# Patient Record
Sex: MALE | Race: BLACK OR AFRICAN AMERICAN | Employment: UNEMPLOYED | URBAN - METROPOLITAN AREA
[De-identification: names, ages, dates, MRNs, and addresses within clinical notes are randomized per-mention and may not be internally consistent; named-entity substitution may affect disease eponyms.]

---

## 2017-03-02 ENCOUNTER — HOSPITAL ENCOUNTER (OUTPATIENT)
Dept: GENERAL RADIOLOGY | Age: 57
Discharge: HOME | End: 2017-03-02
Payer: COMMERCIAL

## 2017-03-02 ENCOUNTER — HOSPITAL ENCOUNTER (OUTPATIENT)
Age: 57
Discharge: HOME | End: 2017-03-02
Payer: COMMERCIAL

## 2017-03-02 DIAGNOSIS — I15.9 SECONDARY HYPERTENSION: ICD-10-CM

## 2017-03-02 PROCEDURE — 71020 XR CHEST STANDARD TWO VW: CPT

## 2017-03-07 ENCOUNTER — HOSPITAL ENCOUNTER (OUTPATIENT)
Dept: PHYSICAL THERAPY | Age: 57
Setting detail: THERAPIES SERIES
Discharge: HOME | End: 2017-03-07
Payer: COMMERCIAL

## 2017-03-07 PROCEDURE — 97162 PT EVAL MOD COMPLEX 30 MIN: CPT

## 2017-03-07 PROCEDURE — 97110 THERAPEUTIC EXERCISES: CPT

## 2017-03-09 ENCOUNTER — HOSPITAL ENCOUNTER (OUTPATIENT)
Dept: PHYSICAL THERAPY | Age: 57
Setting detail: THERAPIES SERIES
Discharge: HOME | End: 2017-03-09
Payer: COMMERCIAL

## 2017-03-09 PROCEDURE — 97110 THERAPEUTIC EXERCISES: CPT

## 2017-03-14 ENCOUNTER — HOSPITAL ENCOUNTER (OUTPATIENT)
Dept: PHYSICAL THERAPY | Age: 57
Setting detail: THERAPIES SERIES
Discharge: HOME | End: 2017-03-14
Payer: COMMERCIAL

## 2017-03-14 PROCEDURE — 97110 THERAPEUTIC EXERCISES: CPT

## 2017-03-16 ENCOUNTER — HOSPITAL ENCOUNTER (OUTPATIENT)
Dept: PHYSICAL THERAPY | Age: 57
Setting detail: THERAPIES SERIES
Discharge: HOME | End: 2017-03-16
Payer: COMMERCIAL

## 2017-03-16 PROCEDURE — 97110 THERAPEUTIC EXERCISES: CPT

## 2017-03-21 ENCOUNTER — HOSPITAL ENCOUNTER (OUTPATIENT)
Dept: PHYSICAL THERAPY | Age: 57
Setting detail: THERAPIES SERIES
Discharge: HOME | End: 2017-03-21
Payer: COMMERCIAL

## 2017-03-21 PROCEDURE — 97110 THERAPEUTIC EXERCISES: CPT

## 2017-03-23 ENCOUNTER — HOSPITAL ENCOUNTER (OUTPATIENT)
Dept: PHYSICAL THERAPY | Age: 57
Setting detail: THERAPIES SERIES
Discharge: HOME | End: 2017-03-23
Payer: COMMERCIAL

## 2017-03-23 PROCEDURE — 97110 THERAPEUTIC EXERCISES: CPT

## 2017-03-31 ENCOUNTER — HOSPITAL ENCOUNTER (OUTPATIENT)
Dept: PHYSICAL THERAPY | Age: 57
Setting detail: THERAPIES SERIES
Discharge: HOME | End: 2017-03-31
Payer: COMMERCIAL

## 2017-03-31 PROCEDURE — 97110 THERAPEUTIC EXERCISES: CPT

## 2019-12-16 ENCOUNTER — HOSPITAL ENCOUNTER (OUTPATIENT)
Age: 59
Discharge: HOME | End: 2019-12-16
Payer: COMMERCIAL

## 2019-12-16 LAB
ABSOLUTE EOS #: 0.1 K/UL (ref 0–0.44)
ABSOLUTE IMMATURE GRANULOCYTE: <0.03 K/UL (ref 0–0.3)
ABSOLUTE LYMPH #: 1.28 K/UL (ref 1.1–3.7)
ABSOLUTE MONO #: 0.22 K/UL (ref 0.1–1.2)
ALBUMIN SERPL-MCNC: 3.9 G/DL (ref 3.5–5.2)
ALBUMIN/GLOBULIN RATIO: 1.3 (ref 1–2.5)
ALP BLD-CCNC: 60 U/L (ref 40–129)
ALT SERPL-CCNC: 18 U/L (ref 5–41)
ANION GAP SERPL CALCULATED.3IONS-SCNC: 9 MMOL/L (ref 9–17)
AST SERPL-CCNC: 22 U/L
BASOPHILS # BLD: 0 % (ref 0–2)
BASOPHILS ABSOLUTE: <0.03 K/UL (ref 0–0.2)
BILIRUB SERPL-MCNC: 0.54 MG/DL (ref 0.3–1.2)
BUN BLDV-MCNC: 10 MG/DL (ref 6–20)
BUN/CREAT BLD: ABNORMAL (ref 9–20)
CALCIUM SERPL-MCNC: 9 MG/DL (ref 8.6–10.4)
CHLORIDE BLD-SCNC: 102 MMOL/L (ref 98–107)
CHOLESTEROL, FASTING: 168 MG/DL
CHOLESTEROL/HDL RATIO: 2.4
CO2: 27 MMOL/L (ref 20–31)
CREAT SERPL-MCNC: 0.69 MG/DL (ref 0.7–1.2)
DIFFERENTIAL TYPE: ABNORMAL
EOSINOPHILS RELATIVE PERCENT: 3 % (ref 1–4)
GFR AFRICAN AMERICAN: >60 ML/MIN
GFR NON-AFRICAN AMERICAN: >60 ML/MIN
GFR SERPL CREATININE-BSD FRML MDRD: ABNORMAL ML/MIN/{1.73_M2}
GFR SERPL CREATININE-BSD FRML MDRD: ABNORMAL ML/MIN/{1.73_M2}
GLUCOSE BLD-MCNC: 98 MG/DL (ref 70–99)
HCT VFR BLD CALC: 42 % (ref 40.7–50.3)
HDLC SERPL-MCNC: 69 MG/DL
HEMOGLOBIN: 14.1 G/DL (ref 13–17)
IMMATURE GRANULOCYTES: 0 %
LDL CHOLESTEROL: 88 MG/DL (ref 0–130)
LYMPHOCYTES # BLD: 41 % (ref 24–43)
MCH RBC QN AUTO: 29.3 PG (ref 25.2–33.5)
MCHC RBC AUTO-ENTMCNC: 33.6 G/DL (ref 28.4–34.8)
MCV RBC AUTO: 87.3 FL (ref 82.6–102.9)
MONOCYTES # BLD: 7 % (ref 3–12)
NRBC AUTOMATED: 0 PER 100 WBC
PDW BLD-RTO: 12.1 % (ref 11.8–14.4)
PLATELET # BLD: 269 K/UL (ref 138–453)
PLATELET ESTIMATE: ABNORMAL
PMV BLD AUTO: 9.7 FL (ref 8.1–13.5)
POTASSIUM SERPL-SCNC: 3.7 MMOL/L (ref 3.7–5.3)
RBC # BLD: 4.81 M/UL (ref 4.21–5.77)
RBC # BLD: ABNORMAL 10*6/UL
SEG NEUTROPHILS: 49 % (ref 36–65)
SEGMENTED NEUTROPHILS ABSOLUTE COUNT: 1.51 K/UL (ref 1.5–8.1)
SODIUM BLD-SCNC: 138 MMOL/L (ref 135–144)
THYROXINE, FREE: 1.12 NG/DL (ref 0.93–1.7)
TOTAL PROTEIN: 6.8 G/DL (ref 6.4–8.3)
TRIGLYCERIDE, FASTING: 55 MG/DL
TSH SERPL DL<=0.05 MIU/L-ACNC: 2.8 MIU/L (ref 0.3–5)
VITAMIN D 25-HYDROXY: 29.2 NG/ML (ref 30–100)
VLDLC SERPL CALC-MCNC: NORMAL MG/DL (ref 1–30)
WBC # BLD: 3.1 K/UL (ref 3.5–11.3)
WBC # BLD: ABNORMAL 10*3/UL

## 2019-12-16 PROCEDURE — 85025 COMPLETE CBC W/AUTO DIFF WBC: CPT

## 2019-12-16 PROCEDURE — 36415 COLL VENOUS BLD VENIPUNCTURE: CPT

## 2019-12-16 PROCEDURE — 84443 ASSAY THYROID STIM HORMONE: CPT

## 2019-12-16 PROCEDURE — 84439 ASSAY OF FREE THYROXINE: CPT

## 2019-12-16 PROCEDURE — 82306 VITAMIN D 25 HYDROXY: CPT

## 2019-12-16 PROCEDURE — 80053 COMPREHEN METABOLIC PANEL: CPT

## 2019-12-16 PROCEDURE — 80061 LIPID PANEL: CPT

## 2021-02-11 ENCOUNTER — HOSPITAL ENCOUNTER (OUTPATIENT)
Age: 61
Setting detail: SPECIMEN
Discharge: HOME | End: 2021-02-11
Payer: COMMERCIAL

## 2021-02-12 LAB
ABSOLUTE EOS #: 0.13 K/UL (ref 0–0.44)
ABSOLUTE IMMATURE GRANULOCYTE: 0.03 K/UL (ref 0–0.3)
ABSOLUTE LYMPH #: 1.47 K/UL (ref 1.1–3.7)
ABSOLUTE MONO #: 0.4 K/UL (ref 0.1–1.2)
ALBUMIN SERPL-MCNC: 4 G/DL (ref 3.5–5.2)
ALBUMIN/GLOBULIN RATIO: 1.4 (ref 1–2.5)
ALP BLD-CCNC: 73 U/L (ref 40–129)
ALT SERPL-CCNC: 19 U/L (ref 5–41)
ANION GAP SERPL CALCULATED.3IONS-SCNC: 10 MMOL/L (ref 9–17)
AST SERPL-CCNC: 23 U/L
BASOPHILS # BLD: 1 % (ref 0–2)
BASOPHILS ABSOLUTE: 0.03 K/UL (ref 0–0.2)
BILIRUB SERPL-MCNC: 0.4 MG/DL (ref 0.3–1.2)
BUN BLDV-MCNC: 12 MG/DL (ref 8–23)
BUN/CREAT BLD: ABNORMAL (ref 9–20)
CALCIUM SERPL-MCNC: 9 MG/DL (ref 8.6–10.4)
CHLORIDE BLD-SCNC: 103 MMOL/L (ref 98–107)
CHOLESTEROL/HDL RATIO: 3
CHOLESTEROL: 160 MG/DL
CO2: 27 MMOL/L (ref 20–31)
CREAT SERPL-MCNC: 0.94 MG/DL (ref 0.7–1.2)
DIFFERENTIAL TYPE: ABNORMAL
EOSINOPHILS RELATIVE PERCENT: 3 % (ref 1–4)
GFR AFRICAN AMERICAN: >60 ML/MIN
GFR NON-AFRICAN AMERICAN: >60 ML/MIN
GFR SERPL CREATININE-BSD FRML MDRD: ABNORMAL ML/MIN/{1.73_M2}
GFR SERPL CREATININE-BSD FRML MDRD: ABNORMAL ML/MIN/{1.73_M2}
GLUCOSE BLD-MCNC: 115 MG/DL (ref 70–99)
HCT VFR BLD CALC: 41.9 % (ref 40.7–50.3)
HDLC SERPL-MCNC: 54 MG/DL
HEMOGLOBIN: 13.5 G/DL (ref 13–17)
IMMATURE GRANULOCYTES: 1 %
LDL CHOLESTEROL: 95 MG/DL (ref 0–130)
LYMPHOCYTES # BLD: 39 % (ref 24–43)
MCH RBC QN AUTO: 28.4 PG (ref 25.2–33.5)
MCHC RBC AUTO-ENTMCNC: 32.2 G/DL (ref 28.4–34.8)
MCV RBC AUTO: 88 FL (ref 82.6–102.9)
MONOCYTES # BLD: 11 % (ref 3–12)
NRBC AUTOMATED: 0 PER 100 WBC
PDW BLD-RTO: 12.4 % (ref 11.8–14.4)
PLATELET # BLD: 284 K/UL (ref 138–453)
PLATELET ESTIMATE: ABNORMAL
PMV BLD AUTO: 11 FL (ref 8.1–13.5)
POTASSIUM SERPL-SCNC: 4.1 MMOL/L (ref 3.7–5.3)
RBC # BLD: 4.76 M/UL (ref 4.21–5.77)
RBC # BLD: ABNORMAL 10*6/UL
SEG NEUTROPHILS: 45 % (ref 36–65)
SEGMENTED NEUTROPHILS ABSOLUTE COUNT: 1.75 K/UL (ref 1.5–8.1)
SODIUM BLD-SCNC: 140 MMOL/L (ref 135–144)
TOTAL PROTEIN: 6.8 G/DL (ref 6.4–8.3)
TRIGL SERPL-MCNC: 53 MG/DL
TSH SERPL DL<=0.05 MIU/L-ACNC: 2.66 MIU/L (ref 0.3–5)
VITAMIN D 25-HYDROXY: 29.6 NG/ML (ref 30–100)
VLDLC SERPL CALC-MCNC: NORMAL MG/DL (ref 1–30)
WBC # BLD: 3.8 K/UL (ref 3.5–11.3)
WBC # BLD: ABNORMAL 10*3/UL

## 2023-03-07 ENCOUNTER — HOSPITAL ENCOUNTER (OUTPATIENT)
Age: 63
Setting detail: SPECIMEN
Discharge: HOME | End: 2023-03-07

## 2023-03-08 LAB
ERYTHROCYTE [SEDIMENTATION RATE] IN BLOOD BY WESTERGREN METHOD: 5 MM/HR (ref 0–20)
EST. AVERAGE GLUCOSE BLD GHB EST-MCNC: 120 MG/DL
FOLATE SERPL-MCNC: 15.9 NG/ML
HBA1C MFR BLD: 5.8 % (ref 4–6)
TSH SERPL-ACNC: 2.37 UIU/ML (ref 0.3–5)
VIT B12 SERPL-MCNC: 862 PG/ML (ref 232–1245)

## 2023-03-10 LAB
ANA SER QL IA: NEGATIVE
DSDNA IGG SER QL IA: <0.5 IU/ML
NUCLEAR IGG SER IA-RTO: 0.3 U/ML

## 2023-11-15 ENCOUNTER — OFFICE VISIT (OUTPATIENT)
Dept: ORTHOPEDIC SURGERY | Age: 63
End: 2023-11-15

## 2023-11-15 VITALS — WEIGHT: 216 LBS | HEIGHT: 68 IN | BODY MASS INDEX: 32.74 KG/M2

## 2023-11-15 DIAGNOSIS — G56.03 BILATERAL CARPAL TUNNEL SYNDROME: ICD-10-CM

## 2023-11-15 DIAGNOSIS — R52 PAIN: Primary | ICD-10-CM

## 2023-11-15 RX ORDER — BUPIVACAINE HYDROCHLORIDE 2.5 MG/ML
0.5 INJECTION, SOLUTION INFILTRATION; PERINEURAL ONCE
Status: COMPLETED | OUTPATIENT
Start: 2023-11-15 | End: 2023-11-15

## 2023-11-15 RX ORDER — LOSARTAN POTASSIUM AND HYDROCHLOROTHIAZIDE 25; 100 MG/1; MG/1
1 TABLET ORAL
COMMUNITY

## 2023-11-15 RX ORDER — OLOPATADINE HYDROCHLORIDE 1 MG/ML
SOLUTION/ DROPS OPHTHALMIC
COMMUNITY
Start: 2023-11-09

## 2023-11-15 RX ORDER — CELECOXIB 100 MG/1
CAPSULE ORAL
COMMUNITY
Start: 2023-10-16

## 2023-11-15 RX ORDER — BETAMETHASONE SODIUM PHOSPHATE AND BETAMETHASONE ACETATE 3; 3 MG/ML; MG/ML
6 INJECTION, SUSPENSION INTRA-ARTICULAR; INTRALESIONAL; INTRAMUSCULAR; SOFT TISSUE ONCE
Status: COMPLETED | OUTPATIENT
Start: 2023-11-15 | End: 2023-11-15

## 2023-11-15 RX ORDER — LIDOCAINE HYDROCHLORIDE 10 MG/ML
0.5 INJECTION, SOLUTION INFILTRATION; PERINEURAL ONCE
Status: COMPLETED | OUTPATIENT
Start: 2023-11-15 | End: 2023-11-15

## 2023-11-15 RX ORDER — SIMVASTATIN 10 MG
TABLET ORAL
COMMUNITY
Start: 2023-10-30

## 2023-11-15 RX ORDER — PREDNISONE 10 MG/1
TABLET ORAL
COMMUNITY
Start: 2023-11-10

## 2023-11-15 RX ORDER — FLUTICASONE PROPIONATE 50 MCG
SPRAY, SUSPENSION (ML) NASAL
COMMUNITY
Start: 2023-11-09

## 2023-11-15 RX ADMIN — LIDOCAINE HYDROCHLORIDE 0.5 ML: 10 INJECTION, SOLUTION INFILTRATION; PERINEURAL at 10:08

## 2023-11-15 RX ADMIN — BETAMETHASONE SODIUM PHOSPHATE AND BETAMETHASONE ACETATE 6 MG: 3; 3 INJECTION, SUSPENSION INTRA-ARTICULAR; INTRALESIONAL; INTRAMUSCULAR; SOFT TISSUE at 10:03

## 2023-11-15 RX ADMIN — BUPIVACAINE HYDROCHLORIDE 1.25 MG: 2.5 INJECTION, SOLUTION INFILTRATION; PERINEURAL at 10:05

## 2023-11-15 RX ADMIN — LIDOCAINE HYDROCHLORIDE 0.5 ML: 10 INJECTION, SOLUTION INFILTRATION; PERINEURAL at 10:07

## 2023-11-15 RX ADMIN — BUPIVACAINE HYDROCHLORIDE 1.25 MG: 2.5 INJECTION, SOLUTION INFILTRATION; PERINEURAL at 10:07

## 2023-11-15 RX ADMIN — BETAMETHASONE SODIUM PHOSPHATE AND BETAMETHASONE ACETATE 6 MG: 3; 3 INJECTION, SUSPENSION INTRA-ARTICULAR; INTRALESIONAL; INTRAMUSCULAR; SOFT TISSUE at 10:05

## 2023-11-15 NOTE — PROGRESS NOTES
deformity. Nontender to palpation including the first [facility] joint. Negative Oralia's test.  Negative Tinel's at the carpal tunnel. Negative reverse Phalen's. Compartments soft. Med/Rad/Ulnar/AIN/PIN motor intact. Axil/MSC/Med/Rad/Ulnar n sensation intact to light touch. Radial pulse 2+. LUE: No laceration, or abrasion. Full range of motion of the fingers with pulp to palm flexion and full extension. There is no outward deformity. Nontender to palpation including the first [facility] joint. Negative Oralia's test.  Negative Tinel's at the carpal tunnel. Negative reverse Phalen's. Compartments soft. Med/Rad/Ulnar/AIN/PIN motor intact. Axil/MSC/Med/Rad/Ulnar n sensation intact to light touch. Radial pulse 2+. Radiology:   History: Right hand swelling and stiffness    Comparison: None    Findings: Films with 3 views of the right hand (AP, lateral, oblique) in a skeletally mature individual demonstrating no acute osseous abnormalities. There appears to be intact Gilula's lines. Well maintained joint spaces throughout the interphalangeal joints and carpal bones. No lytic or blastic lesions noted. No subluxations or dislocations noted. Impression: No acute osseous abnormalities       History: Left hand swelling and stiffness    Comparison: None    Findings: Films with 3 views of the left hand (AP, lateral, oblique) in a skeletally mature individual demonstrating no acute osseous abnormalities. There appears to be intact Gilula's lines. Well maintained joint spaces throughout the interphalangeal joints and carpal bones. No lytic or blastic lesions noted. No subluxations or dislocations noted. Impression: No acute osseous abnormalities    ASSESSMENT:  59 y.o. male with bilateral carpal tunnel    PLAN:  -Lengthy discussion took place with the patient regarding his bilateral hand swelling and pain. He does have EMG studies demonstrating severe bilateral carpal tunnel syndrome.   We discussed his x-ray

## 2024-05-08 ENCOUNTER — OFFICE VISIT (OUTPATIENT)
Dept: ORTHOPEDIC SURGERY | Age: 64
End: 2024-05-08
Payer: COMMERCIAL

## 2024-05-08 VITALS — WEIGHT: 234 LBS | HEIGHT: 68 IN | BODY MASS INDEX: 35.46 KG/M2

## 2024-05-08 DIAGNOSIS — Z01.818 PRE-OP TESTING: Primary | ICD-10-CM

## 2024-05-08 PROCEDURE — 1036F TOBACCO NON-USER: CPT

## 2024-05-08 PROCEDURE — G8427 DOCREV CUR MEDS BY ELIG CLIN: HCPCS

## 2024-05-08 PROCEDURE — 3017F COLORECTAL CA SCREEN DOC REV: CPT

## 2024-05-08 PROCEDURE — G8417 CALC BMI ABV UP PARAM F/U: HCPCS

## 2024-05-08 PROCEDURE — 99213 OFFICE O/P EST LOW 20 MIN: CPT

## 2024-05-08 NOTE — PROGRESS NOTES
[facility] Pending [facility] to [facility]  [address]  [place] [address]  Dept: [phone]  Dept Fax: [phone]        Orthopaedic Clinic Follow Up      Subjective:       Orion Paiz is a 64 y.o. year old male who presents to the clinic today for routine follow up regarding his bilateral wrist and hand pain that has been ongoing for the past several years, but has had minimal releif.  Patient was last seen in our clinic on 11/15/2023, and EMG results were discussed with the patient, which demonstrated severe carpal tunnel syndrome to the bilateral wrist.  Patient at that time did not want any surgical intervention, but he comes in today for discussion about carpal tunnel release.  Patient states that his left is worse than his right, and would like to proceed with carpal tunnel release to the left.    Patient works as a  using equipment because of vibration in his hands and, patient recently says that his  strength is significantly decreased.  Patient has tried cock up wrist splints to the bilateral lower extremities, that helped with his nighttime symptoms.  He was last given a carpal tunnel injection at his last visit with about 2 months of relief, with 70% relief.  He denies any fevers or chills.  Denies any neck previous neck pains or surgery.    Review of Systems  Gen: no fever, chills, malaise  CV: no chest pain or palpitations  Resp: no cough or shortness of breath  GI: no nausea, vomiting, diarrhea, or constipation  Neuro: no seizures, vertigo, or headache  Msk: As per HPI  10 remaining systems reviewed and negative    Objective :   There were no vitals filed for this visit.Body mass index is 35.58 kg/m².  General: No acute distress, resting comfortably in the clinic  Neuro: alert. oriented  Eyes: Extra-ocular muscles intact  Pulm: Respirations unlabored and regular.  Skin: warm, well perfused  Psych:   Patient has good fund of knowledge

## 2024-05-24 ENCOUNTER — HOSPITAL ENCOUNTER (OUTPATIENT)
Dept: PREADMISSION TESTING | Age: 64
End: 2024-05-24
Payer: COMMERCIAL

## 2024-05-24 VITALS
RESPIRATION RATE: 14 BRPM | HEART RATE: 80 BPM | HEIGHT: 68 IN | TEMPERATURE: 97.3 F | DIASTOLIC BLOOD PRESSURE: 78 MMHG | WEIGHT: 230 LBS | BODY MASS INDEX: 34.86 KG/M2 | SYSTOLIC BLOOD PRESSURE: 176 MMHG | OXYGEN SATURATION: 94 %

## 2024-05-24 DIAGNOSIS — Z01.818 PRE-OP TESTING: ICD-10-CM

## 2024-05-24 LAB
ALBUMIN SERPL-MCNC: 4.1 G/DL (ref 3.5–5.2)
ALP SERPL-CCNC: 84 U/L (ref 40–129)
ALT SERPL-CCNC: 18 U/L (ref 5–41)
ANION GAP SERPL CALCULATED.3IONS-SCNC: 10 MMOL/L (ref 9–17)
AST SERPL-CCNC: 20 U/L
BILIRUB SERPL-MCNC: 0.4 MG/DL (ref 0.3–1.2)
BILIRUB UR QL STRIP: NEGATIVE
BUN SERPL-MCNC: 10 MG/DL (ref 8–23)
BUN/CREAT SERPL: 13 (ref 9–20)
CALCIUM SERPL-MCNC: 9.1 MG/DL (ref 8.6–10.4)
CHLORIDE SERPL-SCNC: 101 MMOL/L (ref 98–107)
CLARITY UR: CLEAR
CO2 SERPL-SCNC: 27 MMOL/L (ref 20–31)
COLOR UR: YELLOW
COMMENT: NORMAL
CREAT SERPL-MCNC: 0.8 MG/DL (ref 0.7–1.2)
EKG ATRIAL RATE: 83 BPM
EKG P AXIS: 43 DEGREES
EKG P-R INTERVAL: 154 MS
EKG Q-T INTERVAL: 364 MS
EKG QRS DURATION: 86 MS
EKG QTC CALCULATION (BAZETT): 427 MS
EKG R AXIS: 56 DEGREES
EKG T AXIS: 41 DEGREES
EKG VENTRICULAR RATE: 83 BPM
ERYTHROCYTE [DISTWIDTH] IN BLOOD BY AUTOMATED COUNT: 12.2 % (ref 11.8–14.4)
EST. AVERAGE GLUCOSE BLD GHB EST-MCNC: 120 MG/DL
GFR, ESTIMATED: >90 ML/MIN/1.73M2
GLUCOSE SERPL-MCNC: 136 MG/DL (ref 70–99)
GLUCOSE UR STRIP-MCNC: NEGATIVE MG/DL
HBA1C MFR BLD: 5.8 % (ref 4–6)
HCT VFR BLD AUTO: 41.3 % (ref 40.7–50.3)
HGB BLD-MCNC: 13.8 G/DL (ref 13–17)
HGB UR QL STRIP.AUTO: NEGATIVE
KETONES UR STRIP-MCNC: NEGATIVE MG/DL
LEUKOCYTE ESTERASE UR QL STRIP: NEGATIVE
MCH RBC QN AUTO: 29.7 PG (ref 25.2–33.5)
MCHC RBC AUTO-ENTMCNC: 33.4 G/DL (ref 28.4–34.8)
MCV RBC AUTO: 88.8 FL (ref 82.6–102.9)
NITRITE UR QL STRIP: NEGATIVE
NRBC BLD-RTO: 0 PER 100 WBC
PH UR STRIP: 6 [PH] (ref 5–8)
PLATELET # BLD AUTO: 262 K/UL (ref 138–453)
PMV BLD AUTO: 9.5 FL (ref 8.1–13.5)
POTASSIUM SERPL-SCNC: 3.3 MMOL/L (ref 3.7–5.3)
PROT SERPL-MCNC: 6.7 G/DL (ref 6.4–8.3)
PROT UR STRIP-MCNC: NEGATIVE MG/DL
RBC # BLD AUTO: 4.65 M/UL (ref 4.21–5.77)
SODIUM SERPL-SCNC: 138 MMOL/L (ref 135–144)
SP GR UR STRIP: 1.01 (ref 1–1.03)
UROBILINOGEN UR STRIP-ACNC: NORMAL EU/DL (ref 0–1)
WBC OTHER # BLD: 4 K/UL (ref 3.5–11.3)

## 2024-05-24 PROCEDURE — 85027 COMPLETE CBC AUTOMATED: CPT

## 2024-05-24 PROCEDURE — 36415 COLL VENOUS BLD VENIPUNCTURE: CPT

## 2024-05-24 PROCEDURE — 81003 URINALYSIS AUTO W/O SCOPE: CPT

## 2024-05-24 PROCEDURE — 93005 ELECTROCARDIOGRAM TRACING: CPT

## 2024-05-24 PROCEDURE — 83036 HEMOGLOBIN GLYCOSYLATED A1C: CPT

## 2024-05-24 PROCEDURE — 93010 ELECTROCARDIOGRAM REPORT: CPT | Performed by: INTERNAL MEDICINE

## 2024-05-24 PROCEDURE — 80053 COMPREHEN METABOLIC PANEL: CPT

## 2024-05-24 RX ORDER — ACETAMINOPHEN 160 MG
1 TABLET,DISINTEGRATING ORAL DAILY
COMMUNITY

## 2024-05-24 NOTE — PRE-PROCEDURE INSTRUCTIONS
On the Day of Your Surgery, Monday, 6/3/24, Dr. Tobar's office will call and inform you of the arrival time the day of surgery.     Enter the hospital through the Main Entrance, take the lobby elevators to the second floor and check in at the Surgery Registration desk.     Continue to take your home medications as you normally do up to and including the night before surgery with the exception of blood thinning medications.    Blood Thinning Medications:  Please stop prescription blood thinning medications such as Apixaban (Eliquis); Clopidogrel (Plavix); Dabigatran (Pradaxa); Prasugrel (Effient); Rivaroxaban (Xarelto); Ticagrelor (Brilinta); Warfarin (Coumadin) only as directed by your surgeon and/or the prescribing physician    Some common examples of other medications that can thin your blood are: Aspirin, Ibuprofen (Advil, Motrin), Naproxen (Aleve), Meloxicam (Mobic), Celecoxib (Celebrex), Fish Oil, many Herbal Supplements.  These medications should usually be stopped at least 7 days prior to surgery.    Multivitamin, celebrex.    Tylenol is OK to take for pain the week prior to surgery.    Failure to stop certain medications may interfere with your scheduled surgery.    If you receive instructions from your surgeon regarding what medications to stop prior to surgery, please follow those specific instructions.     Please take the following medication(s) the day of surgery with small sips of water:              None     If you are on medicare and there is a possibility that you will be admitted following surgery:   Please bring your prescription medications in their original bottles with pharmacy label on the day of surgery.    Showering Before Surgery:     You can play an important role in your own health by carefully washing before surgery. Shower the night before and the morning of surgery using the instructions below. If you are allergic to Chlorhexidine Gluconate (CHG) use antibacterial soap instead.    If  alcoholic beverages for 24 hours prior to surgery.  2. You may brush your teeth but do not swallow the water.  3. If you wear glasses bring a case for them if you have one.  No contacts should be worn the day of surgery.  You may also bring your hearing aids. If you have dentures, most surgical procedures involving anesthesia will require that you remove them prior to surgery.  4. If you sleep with a CPAP or BiPAP machine at home and plan on staying in the hospital overnight after surgery, please bring your machine with you.   5. Do not wear any jewelry or body piercings the day of surgery.  No nail polish on the operative extremity (arm/hand or leg/foot surgeries)   6. If you are staying overnight with us, you may bring a small bag of necessary personal items.   7. Please wear loose, comfortable clothing.  If you are potentially going to have a cast, sling, brace or bulky dressing, make sure to wear clothing that will fit over it.   8. In case of illness - If you have cold or flu like symptoms (high fever, runny nose, sore throat, cough, etc.) rash, nausea, vomiting, loose stools, and/or recent contact with someone who has a contagious disease (chicken pox, measles, COVID-19, etc.).  Please call your surgeon before coming to the hospital.    Transportation After Your Surgery/Procedure:     If you are going home the same day of surgery you need someone to drive you home.  Your  must be at least 18 years of age.  A taxi cab or other nonmedical public transportation is not acceptable unless you have someone to ride home in the vehicle with you.   For your safety, someone must remain with you for the first 24 hours after your surgery if you receive anesthesia or medication.  If you do not have someone to stay with you, your procedure may be cancelled.  As a patient at [facility] you can expect quality medical and nursing care that is centered on you individual needs.  Our goal is to make your

## 2024-05-28 NOTE — PERIOP NOTE
Dr. Henriquez reviewed labs, EKG, blood pressure, and medical history. No further intervention needed at this time.

## 2024-06-03 ENCOUNTER — ANESTHESIA (OUTPATIENT)
Dept: OPERATING ROOM | Age: 64
End: 2024-06-03
Payer: COMMERCIAL

## 2024-06-03 ENCOUNTER — HOSPITAL ENCOUNTER (OUTPATIENT)
Age: 64
Setting detail: OUTPATIENT SURGERY
Discharge: HOME | End: 2024-06-03
Attending: ORTHOPAEDIC SURGERY | Admitting: ORTHOPAEDIC SURGERY
Payer: COMMERCIAL

## 2024-06-03 ENCOUNTER — ANESTHESIA EVENT (OUTPATIENT)
Dept: OPERATING ROOM | Age: 64
End: 2024-06-03
Payer: COMMERCIAL

## 2024-06-03 VITALS
BODY MASS INDEX: 34.86 KG/M2 | OXYGEN SATURATION: 96 % | DIASTOLIC BLOOD PRESSURE: 78 MMHG | RESPIRATION RATE: 22 BRPM | WEIGHT: 230 LBS | TEMPERATURE: 97.2 F | SYSTOLIC BLOOD PRESSURE: 128 MMHG | HEIGHT: 68 IN | HEART RATE: 57 BPM

## 2024-06-03 DIAGNOSIS — G89.18 POST-OP PAIN: Primary | ICD-10-CM

## 2024-06-03 PROBLEM — G56.02 LEFT CARPAL TUNNEL SYNDROME: Status: ACTIVE | Noted: 2024-06-03

## 2024-06-03 PROCEDURE — 6360000002 HC RX W HCPCS: Performed by: ORTHOPAEDIC SURGERY

## 2024-06-03 PROCEDURE — 3600000002 HC SURGERY LEVEL 2 BASE: Performed by: ORTHOPAEDIC SURGERY

## 2024-06-03 PROCEDURE — 3700000001 HC ADD 15 MINUTES (ANESTHESIA): Performed by: ORTHOPAEDIC SURGERY

## 2024-06-03 PROCEDURE — 7100000011 HC PHASE II RECOVERY - ADDTL 15 MIN: Performed by: ORTHOPAEDIC SURGERY

## 2024-06-03 PROCEDURE — 64721 CARPAL TUNNEL SURGERY: CPT | Performed by: ORTHOPAEDIC SURGERY

## 2024-06-03 PROCEDURE — 6360000002 HC RX W HCPCS: Performed by: SPECIALIST

## 2024-06-03 PROCEDURE — 2500000003 HC RX 250 WO HCPCS: Performed by: SPECIALIST

## 2024-06-03 PROCEDURE — 2500000003 HC RX 250 WO HCPCS: Performed by: ORTHOPAEDIC SURGERY

## 2024-06-03 PROCEDURE — 3600000012 HC SURGERY LEVEL 2 ADDTL 15MIN: Performed by: ORTHOPAEDIC SURGERY

## 2024-06-03 PROCEDURE — 3700000000 HC ANESTHESIA ATTENDED CARE: Performed by: ORTHOPAEDIC SURGERY

## 2024-06-03 PROCEDURE — 2709999900 HC NON-CHARGEABLE SUPPLY: Performed by: ORTHOPAEDIC SURGERY

## 2024-06-03 PROCEDURE — 2580000003 HC RX 258: Performed by: ANESTHESIOLOGY

## 2024-06-03 PROCEDURE — 7100000010 HC PHASE II RECOVERY - FIRST 15 MIN: Performed by: ORTHOPAEDIC SURGERY

## 2024-06-03 RX ORDER — SODIUM CHLORIDE 0.9 % (FLUSH) 0.9 %
5-40 SYRINGE (ML) INJECTION EVERY 12 HOURS SCHEDULED
Status: DISCONTINUED | OUTPATIENT
Start: 2024-06-03 | End: 2024-06-03 | Stop reason: HOSPADM

## 2024-06-03 RX ORDER — MIDAZOLAM HYDROCHLORIDE 1 MG/ML
INJECTION INTRAMUSCULAR; INTRAVENOUS PRN
Status: DISCONTINUED | OUTPATIENT
Start: 2024-06-03 | End: 2024-06-03 | Stop reason: SDUPTHER

## 2024-06-03 RX ORDER — PROPOFOL 10 MG/ML
INJECTION, EMULSION INTRAVENOUS PRN
Status: DISCONTINUED | OUTPATIENT
Start: 2024-06-03 | End: 2024-06-03 | Stop reason: SDUPTHER

## 2024-06-03 RX ORDER — SODIUM CHLORIDE 9 MG/ML
INJECTION, SOLUTION INTRAVENOUS CONTINUOUS
Status: DISCONTINUED | OUTPATIENT
Start: 2024-06-03 | End: 2024-06-03 | Stop reason: HOSPADM

## 2024-06-03 RX ORDER — SODIUM CHLORIDE 9 MG/ML
INJECTION, SOLUTION INTRAVENOUS PRN
Status: DISCONTINUED | OUTPATIENT
Start: 2024-06-03 | End: 2024-06-03 | Stop reason: HOSPADM

## 2024-06-03 RX ORDER — FENTANYL CITRATE 50 UG/ML
INJECTION, SOLUTION INTRAMUSCULAR; INTRAVENOUS PRN
Status: DISCONTINUED | OUTPATIENT
Start: 2024-06-03 | End: 2024-06-03 | Stop reason: SDUPTHER

## 2024-06-03 RX ORDER — DOCUSATE SODIUM 100 MG/1
100 CAPSULE, LIQUID FILLED ORAL 2 TIMES DAILY PRN
Qty: 20 CAPSULE | Refills: 0 | Status: SHIPPED | OUTPATIENT
Start: 2024-06-03

## 2024-06-03 RX ORDER — OXYCODONE HYDROCHLORIDE AND ACETAMINOPHEN 5; 325 MG/1; MG/1
1 TABLET ORAL EVERY 6 HOURS PRN
Qty: 12 TABLET | Refills: 0 | Status: SHIPPED | OUTPATIENT
Start: 2024-06-03 | End: 2024-06-06

## 2024-06-03 RX ORDER — OXYCODONE HYDROCHLORIDE 5 MG/1
5 TABLET ORAL
Status: DISCONTINUED | OUTPATIENT
Start: 2024-06-03 | End: 2024-06-03 | Stop reason: HOSPADM

## 2024-06-03 RX ORDER — BUPIVACAINE HYDROCHLORIDE 2.5 MG/ML
INJECTION, SOLUTION EPIDURAL; INFILTRATION; INTRACAUDAL
Status: DISCONTINUED
Start: 2024-06-03 | End: 2024-06-03 | Stop reason: HOSPADM

## 2024-06-03 RX ORDER — HALOPERIDOL 5 MG/ML
1 INJECTION INTRAMUSCULAR
Status: DISCONTINUED | OUTPATIENT
Start: 2024-06-03 | End: 2024-06-03 | Stop reason: HOSPADM

## 2024-06-03 RX ORDER — NALOXONE HYDROCHLORIDE 0.4 MG/ML
INJECTION, SOLUTION INTRAMUSCULAR; INTRAVENOUS; SUBCUTANEOUS PRN
Status: DISCONTINUED | OUTPATIENT
Start: 2024-06-03 | End: 2024-06-03 | Stop reason: HOSPADM

## 2024-06-03 RX ORDER — LIDOCAINE HYDROCHLORIDE 10 MG/ML
1 INJECTION, SOLUTION EPIDURAL; INFILTRATION; INTRACAUDAL; PERINEURAL
Status: DISCONTINUED | OUTPATIENT
Start: 2024-06-04 | End: 2024-06-03 | Stop reason: HOSPADM

## 2024-06-03 RX ORDER — LIDOCAINE HYDROCHLORIDE 10 MG/ML
INJECTION, SOLUTION INFILTRATION; PERINEURAL
Status: DISCONTINUED
Start: 2024-06-03 | End: 2024-06-03 | Stop reason: HOSPADM

## 2024-06-03 RX ORDER — SODIUM CHLORIDE 0.9 % (FLUSH) 0.9 %
5-40 SYRINGE (ML) INJECTION PRN
Status: DISCONTINUED | OUTPATIENT
Start: 2024-06-03 | End: 2024-06-03 | Stop reason: HOSPADM

## 2024-06-03 RX ORDER — HYDROMORPHONE HYDROCHLORIDE 1 MG/ML
0.5 INJECTION, SOLUTION INTRAMUSCULAR; INTRAVENOUS; SUBCUTANEOUS EVERY 5 MIN PRN
Status: DISCONTINUED | OUTPATIENT
Start: 2024-06-03 | End: 2024-06-03 | Stop reason: HOSPADM

## 2024-06-03 RX ORDER — LIDOCAINE HYDROCHLORIDE 20 MG/ML
INJECTION, SOLUTION EPIDURAL; INFILTRATION; INTRACAUDAL; PERINEURAL PRN
Status: DISCONTINUED | OUTPATIENT
Start: 2024-06-03 | End: 2024-06-03 | Stop reason: SDUPTHER

## 2024-06-03 RX ORDER — METOCLOPRAMIDE HYDROCHLORIDE 5 MG/ML
10 INJECTION INTRAMUSCULAR; INTRAVENOUS
Status: DISCONTINUED | OUTPATIENT
Start: 2024-06-03 | End: 2024-06-03 | Stop reason: HOSPADM

## 2024-06-03 RX ORDER — SODIUM CHLORIDE, SODIUM LACTATE, POTASSIUM CHLORIDE, CALCIUM CHLORIDE 600; 310; 30; 20 MG/100ML; MG/100ML; MG/100ML; MG/100ML
INJECTION, SOLUTION INTRAVENOUS CONTINUOUS
Status: DISCONTINUED | OUTPATIENT
Start: 2024-06-03 | End: 2024-06-03 | Stop reason: HOSPADM

## 2024-06-03 RX ORDER — FENTANYL CITRATE 50 UG/ML
25 INJECTION, SOLUTION INTRAMUSCULAR; INTRAVENOUS EVERY 5 MIN PRN
Status: DISCONTINUED | OUTPATIENT
Start: 2024-06-03 | End: 2024-06-03 | Stop reason: HOSPADM

## 2024-06-03 RX ORDER — GABAPENTIN 100 MG/1
100 CAPSULE ORAL 2 TIMES DAILY
Qty: 28 CAPSULE | Refills: 0 | Status: SHIPPED | OUTPATIENT
Start: 2024-06-03 | End: 2024-06-17

## 2024-06-03 RX ADMIN — FENTANYL CITRATE 75 MCG: 50 INJECTION INTRAMUSCULAR; INTRAVENOUS at 12:32

## 2024-06-03 RX ADMIN — SODIUM CHLORIDE, POTASSIUM CHLORIDE, SODIUM LACTATE AND CALCIUM CHLORIDE: 600; 310; 30; 20 INJECTION, SOLUTION INTRAVENOUS at 10:11

## 2024-06-03 RX ADMIN — FENTANYL CITRATE 25 MCG: 50 INJECTION INTRAMUSCULAR; INTRAVENOUS at 12:20

## 2024-06-03 RX ADMIN — PROPOFOL 100 MG: 10 INJECTION, EMULSION INTRAVENOUS at 12:20

## 2024-06-03 RX ADMIN — PROPOFOL 50 MG: 10 INJECTION, EMULSION INTRAVENOUS at 12:33

## 2024-06-03 RX ADMIN — LIDOCAINE HYDROCHLORIDE 80 MG: 20 INJECTION, SOLUTION EPIDURAL; INFILTRATION; INTRACAUDAL; PERINEURAL at 12:20

## 2024-06-03 RX ADMIN — MIDAZOLAM 2 MG: 1 INJECTION INTRAMUSCULAR; INTRAVENOUS at 12:14

## 2024-06-03 RX ADMIN — PROPOFOL 75 MCG/KG/MIN: 10 INJECTION, EMULSION INTRAVENOUS at 12:24

## 2024-06-03 RX ADMIN — Medication 2000 MG: at 12:20

## 2024-06-03 ASSESSMENT — LIFESTYLE VARIABLES: SMOKING_STATUS: 0

## 2024-06-03 ASSESSMENT — PAIN - FUNCTIONAL ASSESSMENT
PAIN_FUNCTIONAL_ASSESSMENT: 0-10
PAIN_FUNCTIONAL_ASSESSMENT: FACE, LEGS, ACTIVITY, CRY, AND CONSOLABILITY (FLACC)

## 2024-06-03 NOTE — ANESTHESIA POSTPROCEDURE EVALUATION
Department of Anesthesiology  Postprocedure Note    Patient: Orion Paiz  MRN: [id number]  YOB: 1960  Date of evaluation: 6/3/2024    Procedure Summary       Date: 06/03/24 Room / Location: [address]    Anesthesia Start: 1214 Anesthesia Stop: 1251    Procedure: LEFT CARPAL TUNNEL RELEASE (Left) Diagnosis:       Left carpal tunnel syndrome      (Left carpal tunnel syndrome [G56.02])    Surgeons: Tristen Tobar DO Responsible Provider: Arelis Henriquez MD    Anesthesia Type: general, MAC, TIVA ASA Status: 3            Anesthesia Type: No value filed.    Mukul Phase I: Mukul Score: 10    Mukul Phase II: Mukul Score: 8    Anesthesia Post Evaluation    Patient location during evaluation: PACU  Patient participation: complete - patient participated  Level of consciousness: awake  Airway patency: patent  Nausea & Vomiting: no nausea  Cardiovascular status: blood pressure returned to baseline  Respiratory status: acceptable  Hydration status: euvolemic  Comments: Multimodal analgesia pain management as indicated by procedure  Multimodal analgesia pain management approach  Pain management: adequate    No notable events documented.

## 2024-06-03 NOTE — DISCHARGE INSTRUCTIONS
Orthopaedic Instructions:  -Weight bearing status: Non weight bearing with the left arm.  -Starting three days (Thursday 6/6) after surgery, okay for daily dressing changes until wound/surgical incision site is dry. Dressing changes can be performed with simple Band-aids or gauze pads secured with tape/ace bandages. Once you no longer see drainage from your wounds on your dressings, it is okay to shower. Do not scrub vigorously, just let water run over wound/surgical sites. Additionally, at this point one no longer needs to change dressings daily. It is important that you do not soak the wound/incision site underwater, though. This includes baths, hot tubs, swimming pools, etc. Do not apply lotions or creams over the incision sites.  -Ice (20 minutes on and off 1 hour) and elevate to reduce swelling and throbbing pain.  -Should urinate within 8 hours of surgery.  -Call the office or come to Emergency Room if signs of infection appear (hot, swollen, red, draining pus, fever)  -Take medications as prescribed.  -Wean off narcotics (percocet/norco) as soon as possible. Do not take tylenol if still taking narcotics.  -Follow up with Dr. Tobar's office on 6/19/24 at 8:30am. Call [phone]  to schedule/confirm or with any questions/concerns.

## 2024-06-03 NOTE — ANESTHESIA PRE PROCEDURE
Department of Anesthesiology  Preprocedure Note       Name:  Orion Paiz   Age:  64 y.o.  :  1960                                          MRN:  [id number]         Date:  6/3/2024      Surgeon: Surgeon(s):  Tristen Tobar DO    Procedure: Procedure(s):  LEFT CARPAL TUNNEL RELEASE    Medications prior to admission:   Prior to Admission medications    Medication Sig Start Date End Date Taking? Authorizing Provider   Cholecalciferol (VITAMIN D3) 50 MCG ( UT) CAPS Take 1 capsule by mouth daily    Nakul Armendariz MD   celecoxib (CELEBREX) 100 MG capsule TAKE 1 CASULE BY MOUTH DAILY 10/16/23   Nakul Armendariz MD   fluticasone (FLONASE) 50 MCG/ACT nasal spray  23   Nakul Armendariz MD   losartan-hydroCHLOROthiazide (HYZAAR) 100-25 MG per tablet Take 1 tablet by mouth Every Day    Nakul Armendariz MD   simvastatin (ZOCOR) 10 MG tablet  10/30/23   Nakul Armendariz MD   Multiple Vitamins-Minerals (MULTIVITAMIN ADULT EXTRA C PO) Take 1 tablet by mouth Every Day    Nakul Armendariz MD       Current medications:    No current facility-administered medications for this encounter.       Allergies:  No Known Allergies    Problem List:  There is no problem list on file for this patient.      Past Medical History:        Diagnosis Date    Diabetes mellitus (HCC)     \"borderline\"    Hypertension        Past Surgical History:        Procedure Laterality Date    ANKLE FRACTURE SURGERY Right     as a child       Social History:    Social History     Tobacco Use    Smoking status: Never    Smokeless tobacco: Never   Substance Use Topics    Alcohol use: Not Currently                                Counseling given: Not Answered      Vital Signs (Current): There were no vitals filed for this visit.                                           BP Readings from Last 3 Encounters:   24 (!) 176/78       NPO Status:

## 2024-06-03 NOTE — OP NOTE
being infused. A time-out was held and after all members were in agreement we proceeded forward with surgery.     Under sterile conditions we began with local injection. We injected 10cc of a mixture of 5cc 1% lidocaine plain and 5cc 0.5% marcaine plain in the superficial skin overlying the carpal tunnel and wrist flexion creases, and then directly into the carpal tunnel itself. The entire left arm was then prepped and draped in sterile fashion and a hemaclear tourniquet was applied. A 2cm incision was made with a #15 blade in the palm and carried down under direct vision through the palmar fascia to the transverse carpal ligament. The transverse carpal ligament was incised and the underlying median nerve encountered, it was freed from the undersurface of the ligament and the ligament was released proximally and distally in its entirety with a pair of tenotomy scissors. The median nerve was kept out of harms way and in direct view at all times. The wound was copiously irrigated and the incision was then closed with 3-0 nylon suture in horizontal mattress fashion. The hemaclear tourniquet was removed for a total time of 12 minutes. Sterile adaptik, 4x4s, webril, and an ACE bandage were applied as dressing. Anesthesia was reversed and the patient was taken to postop recovery in stable condition.     Dr. Tobar was present for and active in all critical aspects of the case.     Electronically signed by Delmis Saldaña DO on 6/3/2024 at 12:49 PM

## 2024-06-03 NOTE — BRIEF OP NOTE
Brief Postoperative Note      Patient: Orion Paiz  YOB: 1960  MRN: [id number]    Date of Procedure: 6/3/2024    Pre-Op Diagnosis Codes:     * Left carpal tunnel syndrome [G56.02]    Post-Op Diagnosis: Left carpal tunnel syndrome       Procedure(s):  LEFT CARPAL TUNNEL RELEASE    Surgeon(s):  Tristen Tobar DO    Assistant:  Resident: Delmis Saldaña DO    Anesthesia: Monitor Anesthesia Care    Estimated Blood Loss (mL): Minimal    Complications: None    Specimens:   * No specimens in log *    Implants:  * No implants in log *      Drains: * No LDAs found *    Findings:  Infection Present At Time Of Surgery (PATOS) (choose all levels that have infection present):  No infection present  Other Findings: Impingement of the median nerve beneath the course of the transverse carpal ligament    Electronically signed by Tristen Tobar DO on 6/3/2024 at 12:41 PM

## 2024-06-03 NOTE — H&P
Negative Tinel's at the carpal tunnel.  Negative reverse Phalen's. Compartments soft. Med/Rad/Ulnar/AIN/PIN motor intact. Axil/MSC/Med/Rad/Ulnar n sensation intact to light touch. Radial pulse 2+.    Radiology:  No radiographs taken today     Assessment:   64 y.o. year old male with bilateral carpal tunnel syndrome  Plan:   Today we went over the discussion about the patient's current clinical state, as well as the EMG studies that demonstrated bilateral severe carpal tunnel syndrome.  Due to the patient having relief with his last corticosteroid injection, is most likely that her symptoms are caused by his carpal tunnel syndrome.  For this reason we discussed risk/benefits alternatives regarding a carpal tunnel release, the patient would like to proceed with a left carpal tunnel release.     All treatment options including conservative versus surgical were discussed with the patient in detail. All questions answered appropriately. Surgical risks including but not limited to: bleeding, blood clots, wound complications, infection, damage to surrounding tissues/nerves/vessels, malunion, nonunion, need for further surgery, loss of motion, stiffness, residual pain, risks of anesthesia, loss of limb and loss of life were all discussed with the patient. Knowing these risks, patient wishes to proceed with surgery. Consent was obtained.  All questions answered.  We will proceed with operative intervention at the above listed dates.      Follow up:Return for post-op visit 10-14 days after surgery.    No orders of the defined types were placed in this encounter.         No orders of the defined types were placed in this encounter.      Electronically signed by Radha Dawson DO Orthopedic Surgery Resident on 5/8/2024 at 10:08 AM    This note is created with the assistance of a speech recognition program.  While intending to generate a document that actually reflects the content of the visit, the document can still have some

## 2024-06-19 ENCOUNTER — OFFICE VISIT (OUTPATIENT)
Dept: ORTHOPEDIC SURGERY | Age: 64
End: 2024-06-19

## 2024-06-19 VITALS — BODY MASS INDEX: 34.86 KG/M2 | WEIGHT: 230 LBS | HEIGHT: 68 IN

## 2024-06-19 DIAGNOSIS — Z98.890 S/P CARPAL TUNNEL RELEASE: Primary | ICD-10-CM

## 2024-06-19 PROCEDURE — 99024 POSTOP FOLLOW-UP VISIT: CPT

## 2024-06-19 NOTE — PROGRESS NOTES
[facility]  [address]  [place] [address]  Dept: [phone]  Dept Fax: [phone]        Ambulatory   Follow Up    Subjective:   Orion Paiz is a 64 year old male who presents to our office today for evaluation of his left wrist carpal tunnel release.  Patient underwent surgery for release of his left carpal tunnel on 6/3/2024.  He is here today for his 2-week follow-up and evaluation of his hand.  Patient denies any injuries or new numbness, tingling or weakness.  He does admit to continued prickling sensation at the tip of his first through third digits however he says that it has mildly improved.  Patient states that he no longer has bothersome night symptoms with his left hand.  Patient's pain is well-controlled at this time and he only takes Tylenol or ibuprofen as needed.  Patient is happy with the outcome of his left carpal tunnel release and is interested in discussion for right wrist carpal tunnel release at his next visit.  He has otherwise no orthopedic complaints or concerns at this time.    Review of Systems   Constitutional: Negative for Fever and Chills.   HENT: Negative for Congestion.    Eyes: Negative for Blurred Vision and Double Vision.   Respiratory: Negative for Cough, Shortness of Breath and Wheezing.    Cardiovascular: Negative for Chest Pain and Palpitations.   Gastrointestinal: Negative for Nausea. Negative for Vomiting.   Musculoskeletal: Positive for Myalgias and Joint Pain (bilateral hand dysesthesias).   Skin: Negative for Itching and Rash.   Neurological: Negative for Dizziness, Sensory Change and Headaches.   Psychiatric/Behavioral: Negative for Depression and Suicidal Ideas.     Objective:   General: AAOx3, NAD.  Ortho Exam  Neuro: Alert. Oriented.  Eyes: Extra-Ocular Muscles Intact.  Mouth: Oral Mucosa Moist. No Perioral Lesions.  Pulm: Respirations Unlabored and Regular.  Skin: Warm, Well

## 2024-07-10 ENCOUNTER — OFFICE VISIT (OUTPATIENT)
Dept: ORTHOPEDIC SURGERY | Age: 64
End: 2024-07-10

## 2024-07-10 VITALS — WEIGHT: 240 LBS | HEIGHT: 68 IN | BODY MASS INDEX: 36.37 KG/M2

## 2024-07-10 DIAGNOSIS — G56.01 RIGHT CARPAL TUNNEL SYNDROME: Primary | ICD-10-CM

## 2024-07-10 DIAGNOSIS — Z01.818 PRE-OP TESTING: ICD-10-CM

## 2024-07-11 NOTE — PROGRESS NOTES
I performed a history and physical examination of the patient and discussed management with the resident. I reviewed the physician assistant/resident physician note and agree with the documented findings and plan of care. Any areas of disagreement are noted on the chart.   I have personally evaluated this patient and have completed at least one if not all key elements of the E/M (history, physical exam, and MDM).  Additional findings are as noted. I agree with the chief complaint, past medical history, past surgical history, allergies, medications, social and family history as documented unless otherwise noted below.     Electronically signed by Tristen Tobar DO on 7/11/2024 at 8:05 AM    
I performed a history and physical examination of the patient and discussed management with the resident. I reviewed the physician assistant/resident physician note and agree with the documented findings and plan of care. Any areas of disagreement are noted on the chart.   I have personally evaluated this patient and have completed at least one if not all key elements of the E/M (history, physical exam, and MDM).  Additional findings are as noted. I agree with the chief complaint, past medical history, past surgical history, allergies, medications, social and family history as documented unless otherwise noted below.     Electronically signed by Tristen Tobar DO on 7/11/2024 at 8:15 AM    
Perfused.  Psych: Patient has good fund of knowledge and displays understanding of Exam, Diagnosis, and Plan.  MSK: Range of motion of the cervical spine without pain.  Patient is nontender to palpation along the cervical spine.  Negative Spurling's test and negative Lhermitte's test.    RUE: Skin intact, no lacerations or abrasions appreciated.  Full range of motion of the digits of the right hand with full flexion and extension.  No outward deformity noted.  Decreased  strength of the right hand in comparison to the left.  Positive Durkan's test following 30 seconds of compression.  Negative Tinel's at carpal tunnel.  Negative reverse Phalen's test.  Compartments soft.  Patient does express decreased sensation in a median nerve distribution.  Gross motor of the median/radial/ulnar/AIN/PIN intact.  Sensation intact to light touch ulnar/radial/superficial radial.  Radial pulse 2+.  BCR of all digits.    LUE: Skin intact, no lacerations or abrasions appreciated. Past carpal tunnel release surgical scar.  Full range of motion is to the fingers with flexion and full extension.  No outward deformity noted.  Compartments soft.  Median/radial/ulnar/AIN/PIN gross motor intact. Patient expresses increased sensation in a median nerve distribution since surgery. Radial/superficial radial/ulnar sensation intact to light touch. Radial pulse 2+.  BCR of all digits.    Radiology:   No radiographic images were taking at today's visit.    Assessment:      Right Carpal Tunnel Syndrome    Plan:   Assessment & Plan     - I reviewed the patient's previous EMG study with the patient demonstrating severe carpal tunnel syndrome to the right wrist.  - We discussed the likely cause of their presenting complaint being carpal tunnel syndrome.  - We discussed various treatment alternatives including anti-inflammatory medicines, physical therapy, injections, splinting and, as a last result, surgical interventions.  -As the patient has tried

## 2024-07-22 ENCOUNTER — HOSPITAL ENCOUNTER (OUTPATIENT)
Dept: PREADMISSION TESTING | Age: 64
Discharge: HOME | End: 2024-07-26
Payer: COMMERCIAL

## 2024-07-22 VITALS
HEART RATE: 80 BPM | WEIGHT: 237 LBS | DIASTOLIC BLOOD PRESSURE: 68 MMHG | BODY MASS INDEX: 35.92 KG/M2 | HEIGHT: 68 IN | SYSTOLIC BLOOD PRESSURE: 129 MMHG | TEMPERATURE: 96.6 F | OXYGEN SATURATION: 96 % | RESPIRATION RATE: 16 BRPM

## 2024-07-22 DIAGNOSIS — Z01.818 PRE-OP TESTING: ICD-10-CM

## 2024-07-22 LAB
ALBUMIN SERPL-MCNC: 4.4 G/DL (ref 3.5–5.2)
ALP SERPL-CCNC: 82 U/L (ref 40–129)
ALT SERPL-CCNC: 18 U/L (ref 5–41)
ANION GAP SERPL CALCULATED.3IONS-SCNC: 8 MMOL/L (ref 9–17)
AST SERPL-CCNC: 18 U/L
BILIRUB SERPL-MCNC: 0.5 MG/DL (ref 0.3–1.2)
BILIRUB UR QL STRIP: NEGATIVE
BUN SERPL-MCNC: 12 MG/DL (ref 8–23)
BUN/CREAT SERPL: 12 (ref 9–20)
CALCIUM SERPL-MCNC: 9.6 MG/DL (ref 8.6–10.4)
CHLORIDE SERPL-SCNC: 103 MMOL/L (ref 98–107)
CLARITY UR: CLEAR
CO2 SERPL-SCNC: 29 MMOL/L (ref 20–31)
COLOR UR: YELLOW
COMMENT: NORMAL
CREAT SERPL-MCNC: 1 MG/DL (ref 0.7–1.2)
ERYTHROCYTE [DISTWIDTH] IN BLOOD BY AUTOMATED COUNT: 12.2 % (ref 11.8–14.4)
GFR, ESTIMATED: 85 ML/MIN/1.73M2
GLUCOSE SERPL-MCNC: 114 MG/DL (ref 70–99)
GLUCOSE UR STRIP-MCNC: NEGATIVE MG/DL
HCT VFR BLD AUTO: 44.3 % (ref 40.7–50.3)
HGB BLD-MCNC: 14.5 G/DL (ref 13–17)
HGB UR QL STRIP.AUTO: NEGATIVE
KETONES UR STRIP-MCNC: NEGATIVE MG/DL
LEUKOCYTE ESTERASE UR QL STRIP: NEGATIVE
MCH RBC QN AUTO: 29.2 PG (ref 25.2–33.5)
MCHC RBC AUTO-ENTMCNC: 32.7 G/DL (ref 28.4–34.8)
MCV RBC AUTO: 89.3 FL (ref 82.6–102.9)
NITRITE UR QL STRIP: NEGATIVE
NRBC BLD-RTO: 0 PER 100 WBC
PH UR STRIP: 6.5 [PH] (ref 5–8)
PLATELET # BLD AUTO: 259 K/UL (ref 138–453)
PMV BLD AUTO: 9.6 FL (ref 8.1–13.5)
POTASSIUM SERPL-SCNC: 3.5 MMOL/L (ref 3.7–5.3)
PROT SERPL-MCNC: 7.1 G/DL (ref 6.4–8.3)
PROT UR STRIP-MCNC: NEGATIVE MG/DL
RBC # BLD AUTO: 4.96 M/UL (ref 4.21–5.77)
SODIUM SERPL-SCNC: 140 MMOL/L (ref 135–144)
SP GR UR STRIP: 1.02 (ref 1–1.03)
UROBILINOGEN UR STRIP-ACNC: NORMAL EU/DL (ref 0–1)
WBC OTHER # BLD: 5 K/UL (ref 3.5–11.3)

## 2024-07-22 PROCEDURE — 36415 COLL VENOUS BLD VENIPUNCTURE: CPT

## 2024-07-22 PROCEDURE — 85027 COMPLETE CBC AUTOMATED: CPT

## 2024-07-22 PROCEDURE — 81003 URINALYSIS AUTO W/O SCOPE: CPT

## 2024-07-22 PROCEDURE — 80053 COMPREHEN METABOLIC PANEL: CPT

## 2024-07-22 NOTE — PRE-PROCEDURE INSTRUCTIONS
to have NOTHING to eat or drink after midnight the night before surgery.  This includes no gum, hard candy, mints or water.  The only exception to this is small sips of water to take the medications listed above.  No smoking or chewing tobacco after midnight.  No alcoholic beverages for 24 hours prior to surgery.  2. You may brush your teeth but do not swallow the water.  3. If you wear glasses bring a case for them if you have one.  No contacts should be worn the day of surgery.  You may also bring your hearing aids. If you have dentures, most surgical procedures involving anesthesia will require that you remove them prior to surgery.  4. If you sleep with a CPAP or BiPAP machine at home and plan on staying in the hospital overnight after surgery, please bring your machine with you.   5. Do not wear any jewelry or body piercings the day of surgery.  No nail polish on the operative extremity (arm/hand or leg/foot surgeries)   6. If you are staying overnight with us, you may bring a small bag of necessary personal items.   7. Please wear loose, comfortable clothing.  If you are potentially going to have a cast, sling, brace or bulky dressing, make sure to wear clothing that will fit over it.   8. In case of illness - If you have cold or flu like symptoms (high fever, runny nose, sore throat, cough, etc.) rash, nausea, vomiting, loose stools, and/or recent contact with someone who has a contagious disease (chicken pox, measles, COVID-19, etc.).  Please call your surgeon before coming to the hospital.    Transportation After Your Surgery/Procedure:     If you are going home the same day of surgery you need someone to drive you home.  Your  must be at least 18 years of age.  A taxi cab or other nonmedical public transportation is not acceptable unless you have someone to ride home in the vehicle with you.   For your safety, someone must remain with you for the first 24 hours after your surgery if you receive

## 2024-08-05 ENCOUNTER — HOSPITAL ENCOUNTER (OUTPATIENT)
Age: 64
Setting detail: OUTPATIENT SURGERY
Discharge: HOME | End: 2024-08-05
Attending: ORTHOPAEDIC SURGERY | Admitting: ORTHOPAEDIC SURGERY
Payer: COMMERCIAL

## 2024-08-05 ENCOUNTER — ANESTHESIA (OUTPATIENT)
Dept: OPERATING ROOM | Age: 64
End: 2024-08-05
Payer: COMMERCIAL

## 2024-08-05 ENCOUNTER — ANESTHESIA EVENT (OUTPATIENT)
Dept: OPERATING ROOM | Age: 64
End: 2024-08-05
Payer: COMMERCIAL

## 2024-08-05 VITALS
BODY MASS INDEX: 36.04 KG/M2 | WEIGHT: 237 LBS | SYSTOLIC BLOOD PRESSURE: 127 MMHG | OXYGEN SATURATION: 94 % | HEART RATE: 61 BPM | RESPIRATION RATE: 18 BRPM | TEMPERATURE: 97.3 F | DIASTOLIC BLOOD PRESSURE: 81 MMHG

## 2024-08-05 DIAGNOSIS — G56.01 CARPAL TUNNEL SYNDROME OF RIGHT WRIST: ICD-10-CM

## 2024-08-05 DIAGNOSIS — G89.18 POST-OPERATIVE PAIN: ICD-10-CM

## 2024-08-05 DIAGNOSIS — G56.02 LEFT CARPAL TUNNEL SYNDROME: Primary | ICD-10-CM

## 2024-08-05 PROCEDURE — 7100000011 HC PHASE II RECOVERY - ADDTL 15 MIN: Performed by: ORTHOPAEDIC SURGERY

## 2024-08-05 PROCEDURE — 2709999900 HC NON-CHARGEABLE SUPPLY: Performed by: ORTHOPAEDIC SURGERY

## 2024-08-05 PROCEDURE — 6360000002 HC RX W HCPCS: Performed by: ORTHOPAEDIC SURGERY

## 2024-08-05 PROCEDURE — 3700000001 HC ADD 15 MINUTES (ANESTHESIA): Performed by: ORTHOPAEDIC SURGERY

## 2024-08-05 PROCEDURE — 6360000002 HC RX W HCPCS: Performed by: NURSE ANESTHETIST, CERTIFIED REGISTERED

## 2024-08-05 PROCEDURE — 3700000000 HC ANESTHESIA ATTENDED CARE: Performed by: ORTHOPAEDIC SURGERY

## 2024-08-05 PROCEDURE — 3600000002 HC SURGERY LEVEL 2 BASE: Performed by: ORTHOPAEDIC SURGERY

## 2024-08-05 PROCEDURE — 7100000010 HC PHASE II RECOVERY - FIRST 15 MIN: Performed by: ORTHOPAEDIC SURGERY

## 2024-08-05 PROCEDURE — 2500000003 HC RX 250 WO HCPCS: Performed by: NURSE ANESTHETIST, CERTIFIED REGISTERED

## 2024-08-05 PROCEDURE — 2500000003 HC RX 250 WO HCPCS: Performed by: ORTHOPAEDIC SURGERY

## 2024-08-05 PROCEDURE — 2580000003 HC RX 258: Performed by: ANESTHESIOLOGY

## 2024-08-05 PROCEDURE — 3600000012 HC SURGERY LEVEL 2 ADDTL 15MIN: Performed by: ORTHOPAEDIC SURGERY

## 2024-08-05 PROCEDURE — 2580000003 HC RX 258: Performed by: NURSE ANESTHETIST, CERTIFIED REGISTERED

## 2024-08-05 PROCEDURE — 64721 CARPAL TUNNEL SURGERY: CPT | Performed by: ORTHOPAEDIC SURGERY

## 2024-08-05 RX ORDER — PROCHLORPERAZINE EDISYLATE 5 MG/ML
5 INJECTION INTRAMUSCULAR; INTRAVENOUS
Status: DISCONTINUED | OUTPATIENT
Start: 2024-08-05 | End: 2024-08-05 | Stop reason: HOSPADM

## 2024-08-05 RX ORDER — DIPHENHYDRAMINE HYDROCHLORIDE 50 MG/ML
12.5 INJECTION INTRAMUSCULAR; INTRAVENOUS
Status: DISCONTINUED | OUTPATIENT
Start: 2024-08-05 | End: 2024-08-05 | Stop reason: HOSPADM

## 2024-08-05 RX ORDER — FENTANYL CITRATE 50 UG/ML
INJECTION, SOLUTION INTRAMUSCULAR; INTRAVENOUS PRN
Status: DISCONTINUED | OUTPATIENT
Start: 2024-08-05 | End: 2024-08-05 | Stop reason: SDUPTHER

## 2024-08-05 RX ORDER — LIDOCAINE HYDROCHLORIDE 10 MG/ML
1 INJECTION, SOLUTION EPIDURAL; INFILTRATION; INTRACAUDAL; PERINEURAL
Status: DISCONTINUED | OUTPATIENT
Start: 2024-08-06 | End: 2024-08-05 | Stop reason: HOSPADM

## 2024-08-05 RX ORDER — SODIUM CHLORIDE, SODIUM LACTATE, POTASSIUM CHLORIDE, CALCIUM CHLORIDE 600; 310; 30; 20 MG/100ML; MG/100ML; MG/100ML; MG/100ML
INJECTION, SOLUTION INTRAVENOUS CONTINUOUS PRN
Status: DISCONTINUED | OUTPATIENT
Start: 2024-08-05 | End: 2024-08-05 | Stop reason: SDUPTHER

## 2024-08-05 RX ORDER — SODIUM CHLORIDE 9 MG/ML
INJECTION, SOLUTION INTRAVENOUS CONTINUOUS
Status: DISCONTINUED | OUTPATIENT
Start: 2024-08-05 | End: 2024-08-05 | Stop reason: HOSPADM

## 2024-08-05 RX ORDER — SODIUM CHLORIDE 0.9 % (FLUSH) 0.9 %
5-40 SYRINGE (ML) INJECTION PRN
Status: DISCONTINUED | OUTPATIENT
Start: 2024-08-05 | End: 2024-08-05 | Stop reason: HOSPADM

## 2024-08-05 RX ORDER — SODIUM CHLORIDE, SODIUM LACTATE, POTASSIUM CHLORIDE, CALCIUM CHLORIDE 600; 310; 30; 20 MG/100ML; MG/100ML; MG/100ML; MG/100ML
INJECTION, SOLUTION INTRAVENOUS CONTINUOUS
Status: DISCONTINUED | OUTPATIENT
Start: 2024-08-05 | End: 2024-08-05 | Stop reason: HOSPADM

## 2024-08-05 RX ORDER — DOCUSATE SODIUM 100 MG/1
100 CAPSULE, LIQUID FILLED ORAL 2 TIMES DAILY PRN
Qty: 14 CAPSULE | Refills: 0 | Status: SHIPPED | OUTPATIENT
Start: 2024-08-05 | End: 2024-08-12

## 2024-08-05 RX ORDER — FENTANYL CITRATE 50 UG/ML
25 INJECTION, SOLUTION INTRAMUSCULAR; INTRAVENOUS EVERY 5 MIN PRN
Status: DISCONTINUED | OUTPATIENT
Start: 2024-08-05 | End: 2024-08-05 | Stop reason: HOSPADM

## 2024-08-05 RX ORDER — LIDOCAINE HYDROCHLORIDE 20 MG/ML
INJECTION, SOLUTION EPIDURAL; INFILTRATION; INTRACAUDAL; PERINEURAL PRN
Status: DISCONTINUED | OUTPATIENT
Start: 2024-08-05 | End: 2024-08-05 | Stop reason: SDUPTHER

## 2024-08-05 RX ORDER — ONDANSETRON 2 MG/ML
4 INJECTION INTRAMUSCULAR; INTRAVENOUS
Status: DISCONTINUED | OUTPATIENT
Start: 2024-08-05 | End: 2024-08-05 | Stop reason: HOSPADM

## 2024-08-05 RX ORDER — SODIUM CHLORIDE 9 MG/ML
INJECTION, SOLUTION INTRAVENOUS PRN
Status: DISCONTINUED | OUTPATIENT
Start: 2024-08-05 | End: 2024-08-05 | Stop reason: HOSPADM

## 2024-08-05 RX ORDER — MIDAZOLAM HYDROCHLORIDE 1 MG/ML
INJECTION INTRAMUSCULAR; INTRAVENOUS PRN
Status: DISCONTINUED | OUTPATIENT
Start: 2024-08-05 | End: 2024-08-05 | Stop reason: SDUPTHER

## 2024-08-05 RX ORDER — OXYCODONE HYDROCHLORIDE 5 MG/1
5 TABLET ORAL
Status: DISCONTINUED | OUTPATIENT
Start: 2024-08-05 | End: 2024-08-05 | Stop reason: HOSPADM

## 2024-08-05 RX ORDER — SODIUM CHLORIDE 0.9 % (FLUSH) 0.9 %
5-40 SYRINGE (ML) INJECTION EVERY 12 HOURS SCHEDULED
Status: DISCONTINUED | OUTPATIENT
Start: 2024-08-05 | End: 2024-08-05 | Stop reason: HOSPADM

## 2024-08-05 RX ORDER — HYDROCODONE BITARTRATE AND ACETAMINOPHEN 5; 325 MG/1; MG/1
1 TABLET ORAL EVERY 6 HOURS PRN
Qty: 28 TABLET | Refills: 0 | Status: SHIPPED | OUTPATIENT
Start: 2024-08-05 | End: 2024-08-12

## 2024-08-05 RX ORDER — LIDOCAINE HYDROCHLORIDE 10 MG/ML
INJECTION, SOLUTION INFILTRATION; PERINEURAL
Status: DISCONTINUED
Start: 2024-08-05 | End: 2024-08-05 | Stop reason: HOSPADM

## 2024-08-05 RX ORDER — PROPOFOL 10 MG/ML
INJECTION, EMULSION INTRAVENOUS PRN
Status: DISCONTINUED | OUTPATIENT
Start: 2024-08-05 | End: 2024-08-05 | Stop reason: SDUPTHER

## 2024-08-05 RX ORDER — BUPIVACAINE HYDROCHLORIDE 2.5 MG/ML
INJECTION, SOLUTION EPIDURAL; INFILTRATION; INTRACAUDAL
Status: DISCONTINUED
Start: 2024-08-05 | End: 2024-08-05 | Stop reason: HOSPADM

## 2024-08-05 RX ADMIN — FENTANYL CITRATE 25 MCG: 50 INJECTION INTRAMUSCULAR; INTRAVENOUS at 12:46

## 2024-08-05 RX ADMIN — FENTANYL CITRATE 25 MCG: 50 INJECTION INTRAMUSCULAR; INTRAVENOUS at 12:57

## 2024-08-05 RX ADMIN — FENTANYL CITRATE 25 MCG: 50 INJECTION INTRAMUSCULAR; INTRAVENOUS at 12:37

## 2024-08-05 RX ADMIN — FENTANYL CITRATE 25 MCG: 50 INJECTION INTRAMUSCULAR; INTRAVENOUS at 12:27

## 2024-08-05 RX ADMIN — MIDAZOLAM 2 MG: 1 INJECTION INTRAMUSCULAR; INTRAVENOUS at 12:21

## 2024-08-05 RX ADMIN — PROPOFOL 20 MG: 10 INJECTION, EMULSION INTRAVENOUS at 12:34

## 2024-08-05 RX ADMIN — SODIUM CHLORIDE, POTASSIUM CHLORIDE, SODIUM LACTATE AND CALCIUM CHLORIDE: 600; 310; 30; 20 INJECTION, SOLUTION INTRAVENOUS at 12:21

## 2024-08-05 RX ADMIN — PROPOFOL 100 MCG/KG/MIN: 10 INJECTION, EMULSION INTRAVENOUS at 12:29

## 2024-08-05 RX ADMIN — PROPOFOL 30 MG: 10 INJECTION, EMULSION INTRAVENOUS at 12:27

## 2024-08-05 RX ADMIN — LIDOCAINE HYDROCHLORIDE 60 MG: 20 INJECTION, SOLUTION EPIDURAL; INFILTRATION; INTRACAUDAL; PERINEURAL at 12:27

## 2024-08-05 RX ADMIN — PROPOFOL 50 MG: 10 INJECTION, EMULSION INTRAVENOUS at 12:37

## 2024-08-05 RX ADMIN — SODIUM CHLORIDE, POTASSIUM CHLORIDE, SODIUM LACTATE AND CALCIUM CHLORIDE: 600; 310; 30; 20 INJECTION, SOLUTION INTRAVENOUS at 10:30

## 2024-08-05 RX ADMIN — Medication 2000 MG: at 12:32

## 2024-08-05 ASSESSMENT — PAIN SCALES - GENERAL
PAINLEVEL_OUTOF10: 0

## 2024-08-05 ASSESSMENT — ENCOUNTER SYMPTOMS: SHORTNESS OF BREATH: 0

## 2024-08-05 ASSESSMENT — PAIN - FUNCTIONAL ASSESSMENT: PAIN_FUNCTIONAL_ASSESSMENT: 0-10

## 2024-08-05 NOTE — ANESTHESIA PRE PROCEDURE
Department of Anesthesiology  Preprocedure Note       Name:  Orion Paiz   Age:  64 y.o.  :  1960                                          MRN:  [id number]         Date:  2024      Surgeon: Surgeon(s):  Tristen Tobar DO    Procedure: Procedure(s):  CARPAL TUNNEL RELEASE    Medications prior to admission:   Prior to Admission medications    Medication Sig Start Date End Date Taking? Authorizing Provider   gabapentin (NEURONTIN) 100 MG capsule Take 1 capsule by mouth 2 times daily for 14 days. Intended supply: 30 days 6/3/24 6/17/24  Delmis Saldaña DO   docusate sodium (COLACE) 100 MG capsule Take 1 capsule by mouth 2 times daily as needed for Constipation  Patient not taking: Reported on 2024 6/3/24   Delmis Saldaña DO   Cholecalciferol (VITAMIN D3) 50 MCG ( UT) CAPS Take 1 capsule by mouth daily    ProviderNakul MD   celecoxib (CELEBREX) 100 MG capsule TAKE 1 CASULE BY MOUTH DAILY  Patient not taking: Reported on 2024 10/16/23   Nakul Armendariz MD   fluticasone (FLONASE) 50 MCG/ACT nasal spray  23   Nakul Armendariz MD   losartan-hydroCHLOROthiazide (HYZAAR) 100-25 MG per tablet Take 1 tablet by mouth Every Day    Nakul Armendarzi MD   simvastatin (ZOCOR) 10 MG tablet  10/30/23   Nakul Armendariz MD   Multiple Vitamins-Minerals (MULTIVITAMIN ADULT EXTRA C PO) Take 1 tablet by mouth Every Day    Nakul Armendariz MD       Current medications:    Current Facility-Administered Medications   Medication Dose Route Frequency Provider Last Rate Last Admin   • [START ON 2024] lidocaine PF 1 % injection 1 mL  1 mL IntraDERmal Once PRN Ashish Guillen MD       • 0.9 % sodium chloride infusion   IntraVENous Continuous Ashish Guillen MD       • lactated ringers IV soln infusion   IntraVENous Continuous Ashish Guillen MD       • sodium chloride flush 0.9 % injection 5-40 mL  5-40 mL IntraVENous 2 times per day

## 2024-08-05 NOTE — DISCHARGE INSTRUCTIONS
Orthopedic Instructions:  -Weight bearing status: No heavy lifting pushing or pulling with the right hand.  -Keep dressing clean and dry.  -Starting 3 days after surgery, Ok for daily dressing changes until wound is dry. However, do not submerge wound under water. Keep wound on right hand clean and dry.  -Ice (20 minutes on and off 1 hour) and elevate (above heart) as needed for swelling/pain.  -Drink plenty of fluids.  -Call the office or come to Emergency Room if signs of infection appear (hot, swollen, red, draining pus, fever).  -Take medications as prescribed.  -Wean off narcotics (Percocet/Norco) as soon as possible. Do not take Tylenol if still taking narcotics.  -No alcoholic beverages or driving/operating while taking narcotics.  -Follow up with Dr. Tobar in his office 10-14 days after surgery. Call [phone] to schedule.

## 2024-08-05 NOTE — OP NOTE
Operative Note      Patient: Orion Paiz  YOB: 1960  MRN: [id number]    Date of Procedure: 8/5/2024    Pre-Op Diagnosis Codes:  Right carpal tunnel syndrome    Post-Op Diagnosis: Right carpal tunnel syndrome        Procedure(s):  RIGHT CARPAL TUNNEL RELEASE    Surgeon(s):  Tristen Tobar DO    Assistant:   Resident: Pako Santos DO; Asha Villanueva DO    Anesthesia: Monitor Anesthesia Care    Estimated Blood Loss (mL): Minimal (<1cc)     Fluids: 800 mL crystalloid     Total tourniquet time: 17 minutes with hemaclear application     Complications: None    Specimens:   * No specimens in log *    Implants:  * No implants in log *      Drains: * No LDAs found *    Findings:  Infection Present At Time Of Surgery (PATOS) (choose all levels that have infection present):  No infection present  Other Findings: Compression of median nerve from transverse carpal ligament. .     Detailed Description of Procedure:   INDICATIONS:   The patient is a 64 y.o. male who presented with severe median nerve compression of the right median nerve noted on EMG. He recently had a LEFT carpal tunnel release on 6/3/24 with good results and is interested in a RIGHT sided procedure at this point. Surgical release of the carpal tunnel has been recommended to the patient, to which he agrees. The alternatives, benefits, and risks of the procedure were explained in great length, including, but not limited to the risk of infection, bleeding, blood loss, scar formation, damage to surrounding structures, need for further procedure, delayed wound healing, recurrence, failure to resolve the symptoms, anesthesia risks, loss of function, loss of limb or life.  The above was understood and the patient wished to proceed. The patient was then scheduled for surgery     PROCEDURE:   On the day of surgery the patient was met in the pre-operative unit where written consent was obtained and the operative site was marked with permanent

## 2024-08-05 NOTE — H&P
syndrome.  - Today the patient is amenable to proceed with surgery for right carpal tunnel release by Dr. Tobar.  - All questions were answered to the patient's satisfaction.  -We will proceed with operative intervention.    Follow up: Return for postop visit 10 to 14 days after scheduled surgery    Cory Miguel DO  Orthopedic Surgery, PGY-1  [place], [place]

## 2024-08-05 NOTE — ANESTHESIA POSTPROCEDURE EVALUATION
Department of Anesthesiology  Postprocedure Note    Patient: Orion Paiz  MRN: [id number]  YOB: 1960  Date of evaluation: 8/5/2024    Procedure Summary       Date: 08/05/24 Room / Location: [address]    Anesthesia Start: 1221 Anesthesia Stop: 1311    Procedure: CARPAL TUNNEL RELEASE (Right: Hand) Diagnosis:       Right carpal tunnel syndrome      (Right carpal tunnel syndrome [G56.01])    Surgeons: Tristen Tobar DO Responsible Provider: Tello Valdivia MD    Anesthesia Type: General ASA Status: 2            Anesthesia Type: General    Mukul Phase I:      Mukul Phase II: Mukul Score: 10    Anesthesia Post Evaluation    Airway patency: patent  Cardiovascular status: hemodynamically stable  Respiratory status: acceptable    No notable events documented.

## 2024-08-21 ENCOUNTER — OFFICE VISIT (OUTPATIENT)
Dept: ORTHOPEDIC SURGERY | Age: 64
End: 2024-08-21

## 2024-08-21 VITALS — BODY MASS INDEX: 35.92 KG/M2 | HEIGHT: 68 IN | WEIGHT: 237 LBS

## 2024-08-21 DIAGNOSIS — Z98.890 S/P CARPAL TUNNEL RELEASE: Primary | ICD-10-CM

## 2024-08-21 PROCEDURE — 99024 POSTOP FOLLOW-UP VISIT: CPT | Performed by: STUDENT IN AN ORGANIZED HEALTH CARE EDUCATION/TRAINING PROGRAM

## 2024-08-21 NOTE — PROGRESS NOTES
[facility]  [address]  [place] [address]  Dept: [phone]  Dept Fax: [phone]        Orthopaedic Clinic Follow Up      Subjective:   Surgery: Right carpal tunnel release with Dr. Tobar (8/5/24); Left carpal tunnel release with Dr. Tobar (6/3/24)    Orion Paiz is a 64 y.o. male who presents to the clinic today for routine followup regarding his bilateral carpal tunnel release. He is now over 2 months out from left carpal tunnel release and 2 weeks out from right carpal tunnel release.  Patient states that he has noticed improvement in his right hand numbness and tingling since surgery 2 weeks ago although it is not 100% resolved yet.  He does note that it is improved prior to surgical intervention and believes that it is continuing to improve.  He has no complaints at this time and is interested in returning to work as a .  Regarding his left hand, he has noticed complete resolution at this time from his left carpal tunnel release.     ROS:  Gen: No fevers/chills   Cardio: no chest pain   Lungs: no shortness of breath   MSK: no pain right hand   Neuro: improving numbness/tingling median nerve distribution      Objective :   Physical exam  Gen: AAOx3, no acute distress  Cardio: regular rate  Lungs: symmetrical chest expansion, no audible wheezing   MSK: Right hand: Surgical incision over the palm of the right hand is well-approximated with nylon sutures in place.  No surrounding erythema or swelling.  Nontender to palpation. Hand warm and perfused. Median/Radial/Ulnar/AIN/PIN gross motor intact. Median/Radial/Ulnar nerve SILT.     Radiology:  No imaging obtained today     Assessment:   64 y.o. year old male with the following:      Diagnosis Orders   1. S/P carpal tunnel release           Plan:   Assessment & Plan   Patient seen and examined today.  Sutures were removed from the right hand without difficulty.  We

## 2024-12-26 NOTE — PROGRESS NOTES
Patient instructed to remove shoes and socks and instructed to sit in exam chair.  Current PCP is No primary care provider on file. and date of last visit was unknown.   Do you have a follow up visit scheduled?  No  If yes, the date is unknown

## 2025-01-15 ENCOUNTER — OFFICE VISIT (OUTPATIENT)
Dept: PODIATRY | Age: 65
End: 2025-01-15
Payer: COMMERCIAL

## 2025-01-15 VITALS
BODY MASS INDEX: 35.92 KG/M2 | SYSTOLIC BLOOD PRESSURE: 130 MMHG | DIASTOLIC BLOOD PRESSURE: 78 MMHG | HEART RATE: 65 BPM | WEIGHT: 237 LBS | HEIGHT: 68 IN

## 2025-01-15 DIAGNOSIS — B35.1 ONYCHOMYCOSIS: ICD-10-CM

## 2025-01-15 DIAGNOSIS — L84 CALLUS: Primary | ICD-10-CM

## 2025-01-15 PROCEDURE — 11055 PARING/CUTG B9 HYPRKER LES 1: CPT

## 2025-01-15 PROCEDURE — 99203 OFFICE O/P NEW LOW 30 MIN: CPT | Performed by: PODIATRIST

## 2025-01-15 PROCEDURE — 11720 DEBRIDE NAIL 1-5: CPT

## 2025-01-15 RX ORDER — UREA 40 %
CREAM (GRAM) TOPICAL
Qty: 85 G | Refills: 5 | Status: SHIPPED | OUTPATIENT
Start: 2025-01-15

## 2025-01-15 NOTE — PROGRESS NOTES
[facility]  [address]   [address] [place] [address]  Tel: [phone]   Fax: [phone]    Subjective     CC: Painful and elongated toe nails and callus    HPI:  Orion Paiz is a 64 y.o. year old male who presents to clinic today for painful and elongated toenails and callus.  Patient denies being diabetic. Last A1c was 5.8 on 24. The patient states their digits are painful when the toenails are elongated, causing rubbing in shoe gear. Patient would like treatment for thickened nails. He is also complaining of painful callus to his right hallux. The patient denies any other pedal complaints.     Primary care physician is No primary care provider on file.    ROS:    Constitutional: Denies nausea, vomiting, fever, chills.  Neurologic: Denies numbness, tingling, and burning in the feet.    Vascular: Denies symptoms of lower extremity claudication.    Skin: Denies open wounds.  Otherwise negative except as noted in the HPI.     PMH:  Past Medical History:   Diagnosis Date    Diabetes mellitus (HCC)     \"borderline\"    Hyperlipidemia     Hypertension        Surgical History:   Past Surgical History:   Procedure Laterality Date    ANKLE FRACTURE SURGERY Right     as a child    CARPAL TUNNEL RELEASE Left 6/3/2024    LEFT CARPAL TUNNEL RELEASE performed by Tristen Tobar DO at [facility] OR    CARPAL TUNNEL RELEASE Right 2024    CARPAL TUNNEL RELEASE performed by Tristen Tobar DO at [facility] OR       Social History:  Social History     Tobacco Use    Smoking status: Former     Types: Cigarettes     Start date:      Quit date: 1978     Years since quittin.0    Smokeless tobacco: Never   Vaping Use    Vaping status: Never Used   Substance Use Topics    Alcohol use: Not Currently    Drug use: Never       Medications:  Prior to Admission medications    Medication Sig Start Date End Date Taking? Authorizing Provider   Urea (CARMOL) 40 % cream Apply to callus area only once

## (undated) DEVICE — TOURNIQUET SURGICAL MED YELLOW HEMACLEAR

## (undated) DEVICE — SUTURE ETHILON SZ 3-0 L18IN NONABSORBABLE BLK PS-2 L19MM 3/8 1669H

## (undated) DEVICE — GLOVE SURG SZ 75 CRM LTX FREE POLYISOPRENE POLYMER BEAD ANTI

## (undated) DEVICE — INTENDED FOR TISSUE SEPARATION, AND OTHER PROCEDURES THAT REQUIRE A SHARP SURGICAL BLADE TO PUNCTURE OR CUT.: Brand: BARD-PARKER ® CARBON RIB-BACK BLADES

## (undated) DEVICE — GOWN,SIRUS,NONRNF,SETINSLV,XL,20/CS: Brand: MEDLINE

## (undated) DEVICE — ELECTRODE PT RET AD L9FT HI MOIST COND ADH HYDRGEL CORDED

## (undated) DEVICE — GLOVE SURG SZ 85 CRM LTX FREE POLYISOPRENE POLYMER BEAD ANTI

## (undated) DEVICE — PADDING CAST W2INXL4YD COT LO LINTING WYTEX

## (undated) DEVICE — SOLUTION IRRIG 500ML 0.9% SOD CHLO USP POUR PLAS BTL

## (undated) DEVICE — CORD,CAUTERY,BIPOLAR,STERILE: Brand: MEDLINE

## (undated) DEVICE — BANDAGE COMPR W3INXL5YD WHT BGE POLY COT M E WRP WV HK AND

## (undated) DEVICE — STAZ UPPER EXTREMITY: Brand: MEDLINE INDUSTRIES, INC.

## (undated) DEVICE — GLOVE SURG SZ 65 CRM LTX FREE POLYISOPRENE POLYMER BEAD ANTI

## (undated) DEVICE — 4-PORT MANIFOLD: Brand: NEPTUNE 2

## (undated) DEVICE — NEEDLE HYPO 25GA L1.5IN BLU POLYPR HUB S STL REG BVL STR

## (undated) DEVICE — SYRINGE MED 10ML LUERLOCK TIP W/O SFTY DISP

## (undated) DEVICE — GLOVE SURG SZ 7 L12IN FNGR THK79MIL GRN LTX FREE

## (undated) DEVICE — IMMOBILIZER HND L W24.13XL21.59CM ALUM RADPQ ALUMI-HND